# Patient Record
Sex: FEMALE
[De-identification: names, ages, dates, MRNs, and addresses within clinical notes are randomized per-mention and may not be internally consistent; named-entity substitution may affect disease eponyms.]

---

## 2024-06-03 ENCOUNTER — DASHBOARD ENCOUNTERS (OUTPATIENT)
Age: 39
End: 2024-06-03

## 2024-06-03 ENCOUNTER — OFFICE VISIT (OUTPATIENT)
Dept: URBAN - METROPOLITAN AREA CLINIC 94 | Facility: CLINIC | Age: 39
End: 2024-06-03
Payer: COMMERCIAL

## 2024-06-03 ENCOUNTER — LAB OUTSIDE AN ENCOUNTER (OUTPATIENT)
Dept: URBAN - METROPOLITAN AREA CLINIC 94 | Facility: CLINIC | Age: 39
End: 2024-06-03

## 2024-06-03 VITALS
SYSTOLIC BLOOD PRESSURE: 118 MMHG | DIASTOLIC BLOOD PRESSURE: 78 MMHG | BODY MASS INDEX: 29.23 KG/M2 | WEIGHT: 165 LBS | HEIGHT: 63 IN | HEART RATE: 98 BPM | OXYGEN SATURATION: 98 %

## 2024-06-03 DIAGNOSIS — R19.7 INTERMITTENT DIARRHEA: ICD-10-CM

## 2024-06-03 DIAGNOSIS — R10.84 ABDOMINAL CRAMPING, GENERALIZED: ICD-10-CM

## 2024-06-03 PROCEDURE — 99204 OFFICE O/P NEW MOD 45 MIN: CPT | Performed by: INTERNAL MEDICINE

## 2024-06-03 RX ORDER — AMLODIPINE BESYLATE 10 MG/1
1 TABLET TABLET ORAL ONCE A DAY
Status: ACTIVE | COMMUNITY

## 2024-06-03 NOTE — HPI-TODAY'S VISIT:
39 y/o F hx of HTN presents for NP ER f/u.  Admits to several yr hx of intermittent flares of lower abdominal cramping with nonbloody diarrhea. States these flares are inc in intensity/frequency. Flares will last 2-6 days with sudden onset of lower abdominal pain and nonbloody diarrhea 1-4x/d +/- low grade fever. Has had series of ER visits over last year for episodes with no radiologic evidence of active diverticulitis. States sx do improve with clear liquid based diet. Denies upper abdominal pain, n/v, melena, hematochezia, unintentional wt loss.   Eval at [facility] ED 5/30/24 for abd pain. CT, labs unremarkable. D/c with bentyl, zofran.   No FMHx of CRC.   05/2024 CT a/p: Unremarkable 12/2023 CT a/p: unremarkable.  06/2023 CT a/p: Mild bladder wall thickening 03/2023: CT a/p: unremarkable  No prior CLS, EGD. No heart, lung disease. No blood thinners. No DM meds.

## 2024-06-26 ENCOUNTER — OUT OF OFFICE VISIT (OUTPATIENT)
Dept: URBAN - METROPOLITAN AREA SURGERY CENTER 17 | Facility: SURGERY CENTER | Age: 39
End: 2024-06-26
Payer: COMMERCIAL

## 2024-06-26 ENCOUNTER — OFFICE VISIT (OUTPATIENT)
Dept: URBAN - METROPOLITAN AREA SURGERY CENTER 17 | Facility: SURGERY CENTER | Age: 39
End: 2024-06-26

## 2024-06-26 DIAGNOSIS — R19.4 CHANGE IN BOWEL HABIT: ICD-10-CM

## 2024-06-26 DIAGNOSIS — R10.30 LOWER ABDOMINAL PAIN: ICD-10-CM

## 2024-06-26 PROCEDURE — 00811 ANES LWR INTST NDSC NOS: CPT | Performed by: NURSE ANESTHETIST, CERTIFIED REGISTERED

## 2024-06-26 RX ORDER — AMLODIPINE BESYLATE 10 MG/1
1 TABLET TABLET ORAL ONCE A DAY
Status: ACTIVE | COMMUNITY

## 2024-07-15 ENCOUNTER — OFFICE VISIT (OUTPATIENT)
Dept: URBAN - METROPOLITAN AREA CLINIC 94 | Facility: CLINIC | Age: 39
End: 2024-07-15
Payer: COMMERCIAL

## 2024-07-15 VITALS
HEART RATE: 93 BPM | BODY MASS INDEX: 29.06 KG/M2 | OXYGEN SATURATION: 98 % | TEMPERATURE: 98.1 F | DIASTOLIC BLOOD PRESSURE: 82 MMHG | HEIGHT: 63 IN | SYSTOLIC BLOOD PRESSURE: 122 MMHG | WEIGHT: 164 LBS

## 2024-07-15 DIAGNOSIS — K58.0 IRRITABLE BOWEL SYNDROME WITH DIARRHEA: ICD-10-CM

## 2024-07-15 PROBLEM — 197125005: Status: ACTIVE | Noted: 2024-07-15

## 2024-07-15 PROCEDURE — 99213 OFFICE O/P EST LOW 20 MIN: CPT | Performed by: INTERNAL MEDICINE

## 2024-07-15 RX ORDER — AMLODIPINE BESYLATE 10 MG/1
1 TABLET TABLET ORAL ONCE A DAY
Status: ACTIVE | COMMUNITY

## 2024-07-15 NOTE — HPI-TODAY'S VISIT:
37 y/o F hx of HTN presents for CLS f/u.  Pt admits to intermittent lower abd cramping with watery diarrhea up to 4x/day that occurs 2-3x/wk. Does admit sx have been improving with dietary changes - she is currently attempting elimination diet and notices some improvement in sx with elimination of red meat from diet. Denies epigastric pain, n/v, reflux, dysphagia. Denies any constipation, rectal bleeding, mucus in stool, fever, chills. Has had series of ER visits over the yrs for sx with ? of diverticulitis/osis but recent CLS completely unremarkable.   Eval at [facility] ED 5/30/24 for abd pain. CT, labs unremarkable. D/c with bentyl, zofran.    05/2024 CT a/p: Unremarkable 12/2023 CT a/p: unremarkable.  06/2023 CT a/p: Mild bladder wall thickening 03/2023: CT a/p: unremarkable  06/2024 CLS: Normal mucosa t/o. No bx collected. No diverticulosis.

## 2025-01-15 ENCOUNTER — OFFICE VISIT (OUTPATIENT)
Dept: URBAN - METROPOLITAN AREA CLINIC 94 | Facility: CLINIC | Age: 40
End: 2025-01-15

## 2025-01-15 RX ORDER — AMLODIPINE BESYLATE 10 MG/1
1 TABLET TABLET ORAL ONCE A DAY
COMMUNITY